# Patient Record
Sex: MALE | Race: WHITE | NOT HISPANIC OR LATINO | URBAN - METROPOLITAN AREA
[De-identification: names, ages, dates, MRNs, and addresses within clinical notes are randomized per-mention and may not be internally consistent; named-entity substitution may affect disease eponyms.]

---

## 2023-11-09 ENCOUNTER — HOSPITAL ENCOUNTER (OUTPATIENT)
Dept: RADIOLOGY | Facility: EXTERNAL LOCATION | Age: 48
Discharge: HOME | End: 2023-11-09
Payer: COMMERCIAL

## 2023-11-09 DIAGNOSIS — J20.9 ACUTE BRONCHITIS, UNSPECIFIED ORGANISM: ICD-10-CM

## 2024-02-24 ENCOUNTER — OFFICE VISIT (OUTPATIENT)
Dept: OTOLARYNGOLOGY | Facility: CLINIC | Age: 49
End: 2024-02-24
Payer: COMMERCIAL

## 2024-02-24 DIAGNOSIS — R49.9 CHANGE IN VOICE: ICD-10-CM

## 2024-02-24 DIAGNOSIS — J30.89 NON-SEASONAL ALLERGIC RHINITIS DUE TO OTHER ALLERGIC TRIGGER: ICD-10-CM

## 2024-02-24 DIAGNOSIS — J37.0 CHRONIC LARYNGITIS: ICD-10-CM

## 2024-02-24 DIAGNOSIS — K21.9 GASTROESOPHAGEAL REFLUX DISEASE WITHOUT ESOPHAGITIS: Primary | ICD-10-CM

## 2024-02-24 PROCEDURE — 31575 DIAGNOSTIC LARYNGOSCOPY: CPT | Performed by: OTOLARYNGOLOGY

## 2024-02-24 PROCEDURE — 99214 OFFICE O/P EST MOD 30 MIN: CPT | Performed by: OTOLARYNGOLOGY

## 2024-02-24 PROCEDURE — 1036F TOBACCO NON-USER: CPT | Performed by: OTOLARYNGOLOGY

## 2024-02-24 RX ORDER — AZELASTINE 1 MG/ML
2 SPRAY, METERED NASAL 2 TIMES DAILY
Qty: 90 ML | Refills: 1 | Status: SHIPPED | OUTPATIENT
Start: 2024-02-24

## 2024-02-24 RX ORDER — FLUTICASONE PROPIONATE 50 MCG
2 SPRAY, SUSPENSION (ML) NASAL DAILY
Qty: 48 ML | Refills: 1 | Status: SHIPPED | OUTPATIENT
Start: 2024-02-24

## 2024-02-24 RX ORDER — OMEPRAZOLE 40 MG/1
40 CAPSULE, DELAYED RELEASE ORAL DAILY
Qty: 90 CAPSULE | Refills: 1 | Status: SHIPPED | OUTPATIENT
Start: 2024-02-24

## 2024-02-24 NOTE — PROGRESS NOTES
Subjective   Patient ID: Mark A. Chiacchiari is a 49 y.o. male  HPI  Patient is complaining of throat irritation noted over the last 6 months with frequent clearing of the throat and sore throat in the morning.  He also complains of postnasal drainage and phlegm especially in the morning.  He has no purulence from his nose and no facial pain.  He has not noticed any change in his hearing.    Review of Systems    Objective   Physical Exam  The following elements of a brief ear nose and throat exam were performed: External ear canals and tympanic membranes, external nose and nasal passages, oral cavity, palpation of the neck, percussion of the face, palpation of the thyroid.    There is nasal edema and the turbinates are pale.  There is mild raspiness noted in his voice.  Indirect mirror laryngoscopy is not possible due to a strong gag reflex.  The remainder of his exam was within normal limits.  Fiberoptic laryngoscopy was offered in light of the mild dysphonia and throat irritation.  The nasal cavity is edematous and there is a deviation of the septum to the right side.  There was no polyps or purulence.  The nasopharynx and hypopharynx were noted to be clear.  There was good vocal cord motion bilaterally with posterior interarytenoid edema.    Laryngoscopy    Date/Time: 2/24/2024 10:47 AM    Performed by: Ciara Dennis MD  Authorized by: Ciara Dennis MD    Consent:     Consent obtained:  Verbal    Risks discussed:  Pain  Procedure details:     Indications: hoarseness, dysphagia, or aspiration      Meds administered: Lidocaine and Afrin nasal sprays.    Instrument: flexible fiberoptic laryngoscope      Scope location: bilateral nare        Assessment/Plan   Diagnoses and all orders for this visit:  Gastroesophageal reflux disease without esophagitis (Primary)  -     omeprazole (PriLOSEC) 40 mg DR capsule; Take 1 capsule (40 mg) by mouth once daily. Do not crush or chew.  Change in voice  -      Laryngoscopy  Non-seasonal allergic rhinitis due to other allergic trigger  -     fluticasone (Flonase) 50 mcg/actuation nasal spray; Administer 2 sprays into each nostril once daily. Shake gently. Before first use, prime pump. After use, clean tip and replace cap.  -     azelastine (Astelin) 137 mcg (0.1 %) nasal spray; Administer 2 sprays into each nostril 2 times a day.  Chronic laryngitis     1.  History of left sudden asymmetric sensorineural hearing loss in March 2023 resolved after treatment with prednisone with otherwise normal MRI of the brain and IACs and no change of the hearing.  2.  Chronic allergic rhinitis.  The patient was started on Flonase and Astelin nasal sprays leading to recurrent sore throat in the morning..  3.  Chronic gastroesophageal reflux with chronic laryngitis and mild dysphonia leading to recurrent sore throat in the morning.  The patient was started on omeprazole at 40 mg/day and he was advised to follow acid reflux precautions.  He will follow-up in 1 month.

## 2024-04-06 ENCOUNTER — OFFICE VISIT (OUTPATIENT)
Dept: OTOLARYNGOLOGY | Facility: CLINIC | Age: 49
End: 2024-04-06
Payer: COMMERCIAL

## 2024-04-06 VITALS — BODY MASS INDEX: 31.39 KG/M2 | TEMPERATURE: 97.8 F | WEIGHT: 200 LBS | HEIGHT: 67 IN

## 2024-04-06 DIAGNOSIS — J37.0 CHRONIC LARYNGITIS: ICD-10-CM

## 2024-04-06 DIAGNOSIS — K21.9 GASTROESOPHAGEAL REFLUX DISEASE WITHOUT ESOPHAGITIS: Primary | ICD-10-CM

## 2024-04-06 DIAGNOSIS — J30.89 NON-SEASONAL ALLERGIC RHINITIS DUE TO OTHER ALLERGIC TRIGGER: ICD-10-CM

## 2024-04-06 DIAGNOSIS — R49.9 CHANGE IN VOICE: ICD-10-CM

## 2024-04-06 PROCEDURE — 99213 OFFICE O/P EST LOW 20 MIN: CPT | Performed by: OTOLARYNGOLOGY

## 2024-04-06 PROCEDURE — 1036F TOBACCO NON-USER: CPT | Performed by: OTOLARYNGOLOGY

## 2024-04-06 RX ORDER — FLUTICASONE PROPIONATE 50 MCG
2 SPRAY, SUSPENSION (ML) NASAL DAILY
Qty: 48 ML | Refills: 0 | Status: SHIPPED | OUTPATIENT
Start: 2024-04-06

## 2024-04-06 RX ORDER — OMEPRAZOLE 20 MG/1
TABLET, ORALLY DISINTEGRATING, DELAYED RELEASE ORAL
Qty: 90 TABLET | Refills: 0 | Status: SHIPPED | OUTPATIENT
Start: 2024-04-06

## 2024-04-06 RX ORDER — AZELASTINE 1 MG/ML
2 SPRAY, METERED NASAL 2 TIMES DAILY
Qty: 90 ML | Refills: 0 | Status: SHIPPED | OUTPATIENT
Start: 2024-04-06

## 2024-04-06 NOTE — PROGRESS NOTES
Subjective   Patient ID: Mark A. Chiacchiari is a 49 y.o. male  HPI  Patient presents for follow-up for chronic allergic rhinitis and gastroesophageal reflux with chronic laryngitis and mild hoarseness.  He is feeling better and his voice has improved with the omeprazole.  The rhinorrhea and postnasal drainage have also improved with the Flonase and Astelin.    Review of Systems    Objective   Physical Exam  The following elements of a brief ear nose and throat exam were performed: External ear canals and tympanic membranes, external nose and nasal passages, oral cavity, palpation of the neck, percussion of the face, palpation of the thyroid.    There is nasal edema and the turbinates are pale.  There is mild raspiness noted in his voice.  Indirect mirror laryngoscopy is limited due to a strong gag reflex and there is good full cord motion noted bilaterally..  The remainder of his exam was within normal limits.    Assessment/Plan   Diagnoses and all orders for this visit:  Gastroesophageal reflux disease without esophagitis (Primary)  -     omeprazole 20 mg tablet,disintegrat, delay rel; Take by mouth once a day  Change in voice  Non-seasonal allergic rhinitis due to other allergic trigger  -     fluticasone (Flonase) 50 mcg/actuation nasal spray; Administer 2 sprays into each nostril once daily. Shake gently. Before first use, prime pump. After use, clean tip and replace cap.  -     azelastine (Astelin) 137 mcg (0.1 %) nasal spray; Administer 2 sprays into each nostril 2 times a day.  Chronic laryngitis     1.  History of left sudden asymmetric sensorineural hearing loss in March 2023 resolved after treatment with prednisone with otherwise normal MRI of the brain and IACs and he has not noticed any change in his hearing.  The last hearing test was in March 2023.  2.  Chronic allergic rhinitis improved with Flonase and Astelin.  He was advised to use the allergy medications as needed.  3.  Chronic gastroesophageal  reflux with chronic laryngitis and mild dysphonia improved with omeprazole and acid reflux precautions.  He was advised to continue the omeprazole on a daily basis for 1 more month.  He will subsequently use it as needed and he will follow-up in 2 months.  His prescriptions were renewed today.

## 2024-04-09 DIAGNOSIS — K21.9 GASTROESOPHAGEAL REFLUX DISEASE WITHOUT ESOPHAGITIS: Primary | ICD-10-CM

## 2024-04-09 RX ORDER — OMEPRAZOLE 40 MG/1
40 CAPSULE, DELAYED RELEASE ORAL DAILY
Qty: 90 CAPSULE | Refills: 0 | Status: SHIPPED | OUTPATIENT
Start: 2024-04-09

## 2024-06-04 ENCOUNTER — OFFICE VISIT (OUTPATIENT)
Dept: OTOLARYNGOLOGY | Facility: CLINIC | Age: 49
End: 2024-06-04
Payer: COMMERCIAL

## 2024-06-04 VITALS — WEIGHT: 200 LBS | HEIGHT: 67 IN | BODY MASS INDEX: 31.39 KG/M2

## 2024-06-04 DIAGNOSIS — J37.0 CHRONIC LARYNGITIS: ICD-10-CM

## 2024-06-04 DIAGNOSIS — R49.9 CHANGE IN VOICE: ICD-10-CM

## 2024-06-04 DIAGNOSIS — J30.89 NON-SEASONAL ALLERGIC RHINITIS DUE TO OTHER ALLERGIC TRIGGER: Primary | ICD-10-CM

## 2024-06-04 DIAGNOSIS — K21.9 GASTROESOPHAGEAL REFLUX DISEASE WITHOUT ESOPHAGITIS: ICD-10-CM

## 2024-06-04 PROCEDURE — 99213 OFFICE O/P EST LOW 20 MIN: CPT | Performed by: OTOLARYNGOLOGY

## 2024-06-04 PROCEDURE — 1036F TOBACCO NON-USER: CPT | Performed by: OTOLARYNGOLOGY

## 2024-06-04 RX ORDER — FLUTICASONE PROPIONATE 50 MCG
2 SPRAY, SUSPENSION (ML) NASAL DAILY
Qty: 48 ML | Refills: 3 | Status: SHIPPED | OUTPATIENT
Start: 2024-06-04

## 2024-06-04 RX ORDER — AZELASTINE 1 MG/ML
2 SPRAY, METERED NASAL 2 TIMES DAILY
Qty: 90 ML | Refills: 3 | Status: SHIPPED | OUTPATIENT
Start: 2024-06-04

## 2024-06-04 NOTE — PROGRESS NOTES
Subjective   Patient ID: Mark A. Chiacchiari is a 49 y.o. male  HPI  Patient presents for follow-up for chronic allergic rhinitis and gastroesophageal reflux with chronic laryngitis and recent mild dysphonia.  He has stopped taking the omeprazole and continues to follow acid reflux precautions and has not had any recurrence of the laryngeal symptoms.  He continues to use the Flonase and Astelin nasal sprays.  Review of Systems    Objective   Physical Exam  There is nasal edema and the turbinates are pale.  The oral cavity is clear.  Indirect mirror laryngoscopy is limited due to a strong gag reflex and there is good vocal cord motion noted bilaterally and there is no hoarseness noted today.    Assessment/Plan   Diagnoses and all orders for this visit:  Non-seasonal allergic rhinitis due to other allergic trigger (Primary)  -     fluticasone (Flonase) 50 mcg/actuation nasal spray; Administer 2 sprays into each nostril once daily. Shake gently. Before first use, prime pump. After use, clean tip and replace cap.  -     azelastine (Astelin) 137 mcg (0.1 %) nasal spray; Administer 2 sprays into each nostril 2 times a day.  Chronic laryngitis  Gastroesophageal reflux disease without esophagitis  Change in voice     1.  History of left sudden asymmetric sensorineural hearing loss in March 2023 resolved after treatment with prednisone with otherwise normal MRI of the brain and IACs and he has not noticed any change in his hearing.  The last hearing test was in March 2023.  2.  Chronic allergic rhinitis controlled with Flonase and Astelin nasal sprays.  3.  History of chronic gastroesophageal reflux with chronic laryngitis and mild dysphonia resolved after taking omeprazole for 2 months and currently controlled with acid reflux precautions.  His prescriptions were renewed and he will follow-up in 1 year.